# Patient Record
Sex: MALE | Race: BLACK OR AFRICAN AMERICAN | Employment: STUDENT | URBAN - METROPOLITAN AREA
[De-identification: names, ages, dates, MRNs, and addresses within clinical notes are randomized per-mention and may not be internally consistent; named-entity substitution may affect disease eponyms.]

---
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
## 2022-04-15 ENCOUNTER — LAB ENCOUNTER (OUTPATIENT)
Dept: LAB | Age: 22
End: 2022-04-15
Attending: INTERNAL MEDICINE
Payer: COMMERCIAL

## 2022-04-15 ENCOUNTER — OFFICE VISIT (OUTPATIENT)
Dept: INTERNAL MEDICINE CLINIC | Facility: CLINIC | Age: 22
End: 2022-04-15
Payer: COMMERCIAL

## 2022-04-15 VITALS
RESPIRATION RATE: 12 BRPM | OXYGEN SATURATION: 99 % | HEIGHT: 73.5 IN | WEIGHT: 157.81 LBS | DIASTOLIC BLOOD PRESSURE: 76 MMHG | SYSTOLIC BLOOD PRESSURE: 118 MMHG | TEMPERATURE: 98 F | BODY MASS INDEX: 20.47 KG/M2 | HEART RATE: 70 BPM

## 2022-04-15 DIAGNOSIS — E55.9 VITAMIN D DEFICIENCY: ICD-10-CM

## 2022-04-15 DIAGNOSIS — Z86.19 HISTORY OF HERPES SIMPLEX INFECTION: ICD-10-CM

## 2022-04-15 DIAGNOSIS — Z00.00 ENCOUNTER FOR PREVENTATIVE ADULT HEALTH CARE EXAMINATION: ICD-10-CM

## 2022-04-15 DIAGNOSIS — Z86.19 HISTORY OF HERPES SIMPLEX INFECTION: Primary | ICD-10-CM

## 2022-04-15 DIAGNOSIS — L98.9 SKIN LESION: ICD-10-CM

## 2022-04-15 DIAGNOSIS — Z00.00 ENCOUNTER FOR PREVENTATIVE ADULT HEALTH CARE EXAMINATION: Primary | ICD-10-CM

## 2022-04-15 LAB
ALBUMIN SERPL-MCNC: 4 G/DL (ref 3.4–5)
ALBUMIN/GLOB SERPL: 1 {RATIO} (ref 1–2)
ALP LIVER SERPL-CCNC: 62 U/L
ALT SERPL-CCNC: 45 U/L
ANION GAP SERPL CALC-SCNC: 6 MMOL/L (ref 0–18)
AST SERPL-CCNC: 27 U/L (ref 15–37)
BASOPHILS # BLD AUTO: 0.03 X10(3) UL (ref 0–0.2)
BASOPHILS NFR BLD AUTO: 0.5 %
BILIRUB SERPL-MCNC: 0.3 MG/DL (ref 0.1–2)
BUN BLD-MCNC: 25 MG/DL (ref 7–18)
CALCIUM BLD-MCNC: 9.6 MG/DL (ref 8.5–10.1)
CHLORIDE SERPL-SCNC: 104 MMOL/L (ref 98–112)
CHOLEST SERPL-MCNC: 146 MG/DL (ref ?–200)
CO2 SERPL-SCNC: 30 MMOL/L (ref 21–32)
CREAT BLD-MCNC: 0.97 MG/DL
EOSINOPHIL # BLD AUTO: 0.08 X10(3) UL (ref 0–0.7)
EOSINOPHIL NFR BLD AUTO: 1.3 %
ERYTHROCYTE [DISTWIDTH] IN BLOOD BY AUTOMATED COUNT: 13.2 %
EST. AVERAGE GLUCOSE BLD GHB EST-MCNC: 114 MG/DL (ref 68–126)
FASTING PATIENT LIPID ANSWER: YES
FASTING STATUS PATIENT QL REPORTED: YES
GLOBULIN PLAS-MCNC: 4.2 G/DL (ref 2.8–4.4)
GLUCOSE BLD-MCNC: 89 MG/DL (ref 70–99)
HBA1C MFR BLD: 5.6 % (ref ?–5.7)
HCT VFR BLD AUTO: 45.5 %
HDLC SERPL-MCNC: 47 MG/DL (ref 40–59)
HGB BLD-MCNC: 14.6 G/DL
IMM GRANULOCYTES # BLD AUTO: 0.02 X10(3) UL (ref 0–1)
IMM GRANULOCYTES NFR BLD: 0.3 %
LDLC SERPL CALC-MCNC: 86 MG/DL (ref ?–100)
LYMPHOCYTES # BLD AUTO: 1.35 X10(3) UL (ref 1.5–5)
LYMPHOCYTES NFR BLD AUTO: 21.8 %
MCH RBC QN AUTO: 27.6 PG (ref 26–34)
MCHC RBC AUTO-ENTMCNC: 32.1 G/DL (ref 31–37)
MCV RBC AUTO: 86 FL
MONOCYTES # BLD AUTO: 0.54 X10(3) UL (ref 0.1–1)
MONOCYTES NFR BLD AUTO: 8.7 %
NEUTROPHILS # BLD AUTO: 4.18 X10 (3) UL (ref 1.5–7.7)
NEUTROPHILS # BLD AUTO: 4.18 X10(3) UL (ref 1.5–7.7)
NEUTROPHILS NFR BLD AUTO: 67.4 %
NONHDLC SERPL-MCNC: 99 MG/DL (ref ?–130)
OSMOLALITY SERPL CALC.SUM OF ELEC: 294 MOSM/KG (ref 275–295)
PLATELET # BLD AUTO: 274 10(3)UL (ref 150–450)
POTASSIUM SERPL-SCNC: 4.5 MMOL/L (ref 3.5–5.1)
PROT SERPL-MCNC: 8.2 G/DL (ref 6.4–8.2)
RBC # BLD AUTO: 5.29 X10(6)UL
SODIUM SERPL-SCNC: 140 MMOL/L (ref 136–145)
TRIGL SERPL-MCNC: 63 MG/DL (ref 30–149)
TSI SER-ACNC: 1.54 MIU/ML (ref 0.36–3.74)
VIT D+METAB SERPL-MCNC: 20.8 NG/ML (ref 30–100)
VLDLC SERPL CALC-MCNC: 10 MG/DL (ref 0–30)
WBC # BLD AUTO: 6.2 X10(3) UL (ref 4–11)

## 2022-04-15 PROCEDURE — 80053 COMPREHEN METABOLIC PANEL: CPT

## 2022-04-15 PROCEDURE — 3078F DIAST BP <80 MM HG: CPT | Performed by: INTERNAL MEDICINE

## 2022-04-15 PROCEDURE — 85025 COMPLETE CBC W/AUTO DIFF WBC: CPT

## 2022-04-15 PROCEDURE — 3008F BODY MASS INDEX DOCD: CPT | Performed by: INTERNAL MEDICINE

## 2022-04-15 PROCEDURE — 36415 COLL VENOUS BLD VENIPUNCTURE: CPT

## 2022-04-15 PROCEDURE — 84443 ASSAY THYROID STIM HORMONE: CPT

## 2022-04-15 PROCEDURE — 82306 VITAMIN D 25 HYDROXY: CPT

## 2022-04-15 PROCEDURE — 3074F SYST BP LT 130 MM HG: CPT | Performed by: INTERNAL MEDICINE

## 2022-04-15 PROCEDURE — 99395 PREV VISIT EST AGE 18-39: CPT | Performed by: INTERNAL MEDICINE

## 2022-04-15 PROCEDURE — 80061 LIPID PANEL: CPT

## 2022-04-15 PROCEDURE — 83036 HEMOGLOBIN GLYCOSYLATED A1C: CPT

## 2022-04-15 PROCEDURE — 87529 HSV DNA AMP PROBE: CPT

## 2022-04-15 PROCEDURE — 86694 HERPES SIMPLEX NES ANTBDY: CPT

## 2022-04-15 NOTE — PATIENT INSTRUCTIONS
- Get blood work done today  - See handout on HSV  - Go to a health professional any time you have an outbreak (tingling/burning, bumps/skin lesions in genital area)  - Your leg rash is not related to HSV. Likely infected hair follicles. Use triamcinolone steroid cream as needed  - Follow up with Dr. Valentina Cameron when you are back in town in the summer to discuss your excessive sweating. It was a pleasure seeing you in the clinic today. Thank you for choosing the [facility] office for your healthcare needs. Please call at [phone] with any questions or concerns.     Justin Coto MD

## 2022-04-17 PROBLEM — Z86.19 HISTORY OF HERPES SIMPLEX INFECTION: Status: ACTIVE | Noted: 2022-04-17

## 2022-04-19 LAB
HSV 1 SUBTYPE BY PCR: NOT DETECTED
HSV 2 SUBTYPE BY PCR: NOT DETECTED

## 2022-07-28 ENCOUNTER — OFFICE VISIT (OUTPATIENT)
Dept: INTERNAL MEDICINE CLINIC | Facility: CLINIC | Age: 22
End: 2022-07-28
Payer: COMMERCIAL

## 2022-07-28 VITALS
HEART RATE: 87 BPM | OXYGEN SATURATION: 98 % | RESPIRATION RATE: 16 BRPM | TEMPERATURE: 98 F | WEIGHT: 160.19 LBS | DIASTOLIC BLOOD PRESSURE: 60 MMHG | SYSTOLIC BLOOD PRESSURE: 104 MMHG | BODY MASS INDEX: 20.78 KG/M2 | HEIGHT: 73.62 IN

## 2022-07-28 DIAGNOSIS — B00.9 RECURRENT HERPES SIMPLEX: Primary | ICD-10-CM

## 2022-07-28 DIAGNOSIS — N50.819 TESTICULAR DISCOMFORT: ICD-10-CM

## 2022-07-28 DIAGNOSIS — Z23 NEED FOR DIPHTHERIA-TETANUS-PERTUSSIS (TDAP) VACCINE: ICD-10-CM

## 2022-07-28 DIAGNOSIS — E55.9 VITAMIN D DEFICIENCY: ICD-10-CM

## 2022-07-28 PROCEDURE — 3074F SYST BP LT 130 MM HG: CPT | Performed by: INTERNAL MEDICINE

## 2022-07-28 PROCEDURE — 99214 OFFICE O/P EST MOD 30 MIN: CPT | Performed by: INTERNAL MEDICINE

## 2022-07-28 PROCEDURE — 3008F BODY MASS INDEX DOCD: CPT | Performed by: INTERNAL MEDICINE

## 2022-07-28 PROCEDURE — 3078F DIAST BP <80 MM HG: CPT | Performed by: INTERNAL MEDICINE

## 2022-07-28 RX ORDER — VALACYCLOVIR HYDROCHLORIDE 500 MG/1
500 TABLET, FILM COATED ORAL 2 TIMES DAILY
Qty: 24 TABLET | Refills: 1 | Status: SHIPPED | OUTPATIENT
Start: 2022-07-28 | End: 2022-07-31

## 2022-07-28 NOTE — PATIENT INSTRUCTIONS
- Valacyclovir refilled. Take as needed for herpes flare ups. Take 1 tablet twice daily for 3 days.  - Generally, for a flare-up, you will feel tingling/burning throughout the day, not just at night when you are sleeping  - Let us know if you feel a large lump on your testicles, or if you notice significant swelling of the testicles/scrotum  - Your symptoms when you wear your belt too tight are from the belt pressing on your nerves (not herpes related)  - You are due for a tetanus booster shot. Due every 10 years. You can schedule a nurse visit by calling our office at [phone] or can get the tetanus shot at your [facility] department. It was a pleasure seeing you in the clinic today. Thank you for choosing the [facility] office for your healthcare needs. Please call at [phone] with any questions or concerns.     Gladis Bergman MD

## 2022-09-02 ENCOUNTER — PATIENT MESSAGE (OUTPATIENT)
Dept: INTERNAL MEDICINE CLINIC | Facility: CLINIC | Age: 22
End: 2022-09-02

## 2022-09-02 DIAGNOSIS — B00.9 RECURRENT HERPES SIMPLEX: ICD-10-CM

## 2022-09-03 RX ORDER — VALACYCLOVIR HYDROCHLORIDE 500 MG/1
500 TABLET, FILM COATED ORAL 2 TIMES DAILY
Qty: 24 TABLET | Refills: 1 | Status: SHIPPED | OUTPATIENT
Start: 2022-09-03 | End: 2022-09-06

## 2022-10-05 ENCOUNTER — PATIENT MESSAGE (OUTPATIENT)
Dept: INTERNAL MEDICINE CLINIC | Facility: CLINIC | Age: 22
End: 2022-10-05

## 2022-10-05 DIAGNOSIS — B00.9 RECURRENT HERPES SIMPLEX: ICD-10-CM

## 2022-10-05 RX ORDER — VALACYCLOVIR HYDROCHLORIDE 500 MG/1
500 TABLET, FILM COATED ORAL 2 TIMES DAILY
Qty: 24 TABLET | Refills: 1 | Status: CANCELLED
Start: 2022-10-05 | End: 2022-10-08

## 2022-10-28 ENCOUNTER — PATIENT MESSAGE (OUTPATIENT)
Dept: INTERNAL MEDICINE CLINIC | Facility: CLINIC | Age: 22
End: 2022-10-28

## 2022-10-28 NOTE — TELEPHONE ENCOUNTER
From: Yue Monae  To: Aaron Black MD  Sent: 10/28/2022 10:36 AM CDT  Subject: Refill    Hermelinda Singletary   I was wondering if you could send a prescription for me ?

## 2022-11-10 RX ORDER — ERYTHROMYCIN 5 MG/G
1 OINTMENT OPHTHALMIC NIGHTLY
Qty: 1 G | Refills: 0 | Status: SHIPPED | OUTPATIENT
Start: 2022-11-10

## 2022-12-02 DIAGNOSIS — B00.9 RECURRENT HERPES SIMPLEX: ICD-10-CM

## 2022-12-05 RX ORDER — VALACYCLOVIR HYDROCHLORIDE 500 MG/1
500 TABLET, FILM COATED ORAL 2 TIMES DAILY
Qty: 24 TABLET | Refills: 1 | Status: SHIPPED | OUTPATIENT
Start: 2022-12-05

## 2022-12-18 ENCOUNTER — PATIENT MESSAGE (OUTPATIENT)
Dept: INTERNAL MEDICINE CLINIC | Facility: CLINIC | Age: 22
End: 2022-12-18

## 2022-12-18 DIAGNOSIS — B00.9 RECURRENT HERPES SIMPLEX: ICD-10-CM

## 2022-12-19 RX ORDER — VALACYCLOVIR HYDROCHLORIDE 500 MG/1
500 TABLET, FILM COATED ORAL 2 TIMES DAILY
Qty: 24 TABLET | Refills: 1 | Status: SHIPPED | OUTPATIENT
Start: 2022-12-19 | End: 2023-01-05

## 2022-12-19 NOTE — TELEPHONE ENCOUNTER
From: Gavin Wiggins  To: Neha Dickinson MD  Sent: 12/18/2022 7:21 PM CST  Subject: Diagnoses     Good afternoon I was wondering if you could tell me the difference between herpes and HIV which I have and what type. Also what are the chances of this developing into something worse.

## 2023-01-05 RX ORDER — VALACYCLOVIR HYDROCHLORIDE 500 MG/1
500 TABLET, FILM COATED ORAL 2 TIMES DAILY
Qty: 24 TABLET | Refills: 1 | Status: SHIPPED | OUTPATIENT
Start: 2023-01-05

## 2023-01-05 NOTE — TELEPHONE ENCOUNTER
Future Appointments   Date Time Provider Onel Kamara   1/14/2023 11:30 AM Deanne Medina MD [facility] [place]     Scheduled as a follow up, pt not due for cpx. Pt aware.

## 2023-01-08 ENCOUNTER — PATIENT MESSAGE (OUTPATIENT)
Dept: INTERNAL MEDICINE CLINIC | Facility: CLINIC | Age: 23
End: 2023-01-08

## 2023-01-09 NOTE — TELEPHONE ENCOUNTER
Had the 8:30 tomorrow on hold for him - can we add him on at 8:30 AM 1/10/23 and send him a inEarth message once he is scheduled

## 2023-01-10 ENCOUNTER — OFFICE VISIT (OUTPATIENT)
Dept: INTERNAL MEDICINE CLINIC | Facility: CLINIC | Age: 23
End: 2023-01-10
Payer: COMMERCIAL

## 2023-01-10 VITALS
WEIGHT: 161.38 LBS | TEMPERATURE: 98 F | HEART RATE: 85 BPM | OXYGEN SATURATION: 100 % | HEIGHT: 73.62 IN | SYSTOLIC BLOOD PRESSURE: 110 MMHG | RESPIRATION RATE: 16 BRPM | DIASTOLIC BLOOD PRESSURE: 70 MMHG | BODY MASS INDEX: 20.93 KG/M2

## 2023-01-10 DIAGNOSIS — E55.9 VITAMIN D DEFICIENCY: ICD-10-CM

## 2023-01-10 DIAGNOSIS — B00.9 RECURRENT HERPES SIMPLEX: Primary | ICD-10-CM

## 2023-01-10 DIAGNOSIS — Z23 NEED FOR IMMUNIZATION AGAINST INFLUENZA: ICD-10-CM

## 2023-01-10 PROCEDURE — 90471 IMMUNIZATION ADMIN: CPT | Performed by: INTERNAL MEDICINE

## 2023-01-10 PROCEDURE — 90686 IIV4 VACC NO PRSV 0.5 ML IM: CPT | Performed by: INTERNAL MEDICINE

## 2023-01-10 PROCEDURE — 3074F SYST BP LT 130 MM HG: CPT | Performed by: INTERNAL MEDICINE

## 2023-01-10 PROCEDURE — 99214 OFFICE O/P EST MOD 30 MIN: CPT | Performed by: INTERNAL MEDICINE

## 2023-01-10 PROCEDURE — 87529 HSV DNA AMP PROBE: CPT | Performed by: INTERNAL MEDICINE

## 2023-01-10 PROCEDURE — 3078F DIAST BP <80 MM HG: CPT | Performed by: INTERNAL MEDICINE

## 2023-01-10 PROCEDURE — 3008F BODY MASS INDEX DOCD: CPT | Performed by: INTERNAL MEDICINE

## 2023-01-10 RX ORDER — VALACYCLOVIR HYDROCHLORIDE 500 MG/1
500 TABLET, FILM COATED ORAL DAILY
Qty: 90 TABLET | Refills: 3 | Status: SHIPPED | OUTPATIENT
Start: 2023-01-10

## 2023-01-10 NOTE — PATIENT INSTRUCTIONS
- We will start you on daily preventative dosing for the herpes. Take 1 tablet of valacyclovir (500 mg) every day  - Use condoms at all times  - Do not have intercourse when you have bumps/rash/blisters in genital area  - Will swab your scars for herpes and send to the lab as well. It was a pleasure seeing you in the clinic today. Thank you for choosing the [facility] office for your healthcare needs. Please call at [phone] with any questions or concerns.     Shama Poole MD

## 2023-01-11 LAB
HSV1 DNA SPEC QL NAA+PROBE: NEGATIVE
HSV2 DNA SPEC QL NAA+PROBE: NEGATIVE

## 2023-03-07 DIAGNOSIS — B00.9 RECURRENT HERPES SIMPLEX: ICD-10-CM

## 2023-03-08 ENCOUNTER — PATIENT MESSAGE (OUTPATIENT)
Dept: INTERNAL MEDICINE CLINIC | Facility: CLINIC | Age: 23
End: 2023-03-08

## 2023-03-08 RX ORDER — VALACYCLOVIR HYDROCHLORIDE 500 MG/1
500 TABLET, FILM COATED ORAL DAILY
Qty: 90 TABLET | Refills: 3 | OUTPATIENT
Start: 2023-03-08

## 2023-05-22 DIAGNOSIS — B00.9 RECURRENT HERPES SIMPLEX: ICD-10-CM

## 2023-05-23 RX ORDER — VALACYCLOVIR HYDROCHLORIDE 500 MG/1
500 TABLET, FILM COATED ORAL DAILY
Qty: 90 TABLET | Refills: 3 | OUTPATIENT
Start: 2023-05-23

## 2023-05-28 DIAGNOSIS — B00.9 RECURRENT HERPES SIMPLEX: ICD-10-CM

## 2023-05-30 RX ORDER — VALACYCLOVIR HYDROCHLORIDE 500 MG/1
500 TABLET, FILM COATED ORAL DAILY
Qty: 90 TABLET | Refills: 3 | Status: SHIPPED | OUTPATIENT
Start: 2023-05-30

## 2023-08-14 DIAGNOSIS — B00.9 RECURRENT HERPES SIMPLEX: ICD-10-CM

## 2023-08-14 RX ORDER — VALACYCLOVIR HYDROCHLORIDE 500 MG/1
500 TABLET, FILM COATED ORAL DAILY
Qty: 90 TABLET | Refills: 3 | Status: CANCELLED | OUTPATIENT
Start: 2023-08-14

## 2023-08-30 DIAGNOSIS — B00.9 RECURRENT HERPES SIMPLEX: ICD-10-CM

## 2023-08-30 RX ORDER — VALACYCLOVIR HYDROCHLORIDE 500 MG/1
500 TABLET, FILM COATED ORAL DAILY
Qty: 90 TABLET | Refills: 3 | Status: SHIPPED | OUTPATIENT
Start: 2023-08-30

## 2023-10-11 ENCOUNTER — PATIENT MESSAGE (OUTPATIENT)
Dept: INTERNAL MEDICINE CLINIC | Facility: CLINIC | Age: 23
End: 2023-10-11

## 2023-11-27 DIAGNOSIS — B00.9 RECURRENT HERPES SIMPLEX: ICD-10-CM

## 2023-11-27 RX ORDER — VALACYCLOVIR HYDROCHLORIDE 500 MG/1
500 TABLET, FILM COATED ORAL DAILY
Qty: 90 TABLET | Refills: 3 | Status: CANCELLED | OUTPATIENT
Start: 2023-11-27

## 2023-11-27 NOTE — TELEPHONE ENCOUNTER
valACYclovir 500 MG Oral Tab         Sig: Take 1 tablet (500 mg total) by mouth daily. Take for flare-ups. Disp: 90 tablet    Refills: 3    Start: 11/27/2023    Class: Normal    Non-formulary For: Recurrent herpes simplex    To pharmacy: DX Code B00.9    Last ordered: 2 months ago (8/30/2023) by Marcelino Ellsworth MD    Patient comment: I usually  3 bottles of it which lasts me about 3 months i would like to continue doing that    Herpes Agent Protocol [id number]/27/2023 03:56 PM    Appointment in the past 12 or next 3 months      LOV 1/10/23  RTC 6 months  Filled 90 tabs 3 refills   No future appointments.

## 2024-08-16 ENCOUNTER — PATIENT MESSAGE (OUTPATIENT)
Dept: INTERNAL MEDICINE CLINIC | Facility: CLINIC | Age: 24
End: 2024-08-16

## 2024-08-16 DIAGNOSIS — B00.9 RECURRENT HERPES SIMPLEX: ICD-10-CM

## 2024-08-19 RX ORDER — VALACYCLOVIR HYDROCHLORIDE 500 MG/1
500 TABLET, FILM COATED ORAL DAILY
Qty: 90 TABLET | Refills: 3 | Status: SHIPPED | OUTPATIENT
Start: 2024-08-19

## 2024-08-20 NOTE — TELEPHONE ENCOUNTER
From: Jose Luis Thomas  To: Deanne Hoff  Sent: 8/16/2024 6:07 PM CDT  Subject: Refill prescription/ hairline help     Hello hope life’s been good my corners of my hairline are thinning out really bad and I’m 24 I don’t want to literally go bald and I’m adopted so krystyna my genes but if I can make it to 32 with hair that would be great any medication proven to work or anything let me know please. The photo doesn’t do justice bc my hairline is tapered intentionally so my dreads wouldn’t pull in my hairline but the corners hang down covering bald spots     Also could I get the three months upfront at [facility] in [place] I’m going down tomorrow   
RP: please see mcm, is there anything for hair thinning? Or best to set up OV to discuss? Pt hasn't been seen in office for over 1.5 years  
Responded to patient in [facility]  
Valcyclovir refilled to [facility] in [place]  Recommend follow up with Dermatology for hair loss/hairline    [facility]  [address]  [place], [place] [address] [phone]    [facility]  [address], [address]   [place] [address]  [phone]    [facility]  [facility] [place] [address]  T: [phone]  
stated

## 2025-02-10 ENCOUNTER — TELEPHONE (OUTPATIENT)
Dept: INTERNAL MEDICINE CLINIC | Facility: CLINIC | Age: 25
End: 2025-02-10

## 2025-02-10 NOTE — TELEPHONE ENCOUNTER
Spoke with patient, informed him of walk-in clinics and urgent cares associated with [facility] in [place] for him to be evaluated since he is unable to schedule with RP.

## 2025-05-25 DIAGNOSIS — B00.9 RECURRENT HERPES SIMPLEX: ICD-10-CM

## 2025-05-27 RX ORDER — VALACYCLOVIR HYDROCHLORIDE 500 MG/1
500 TABLET, FILM COATED ORAL DAILY
Qty: 90 TABLET | Refills: 3 | OUTPATIENT
Start: 2025-05-27

## 2025-08-22 ENCOUNTER — OFFICE VISIT (OUTPATIENT)
Dept: INTERNAL MEDICINE CLINIC | Facility: CLINIC | Age: 25
End: 2025-08-22

## 2025-08-22 VITALS
RESPIRATION RATE: 18 BRPM | DIASTOLIC BLOOD PRESSURE: 78 MMHG | TEMPERATURE: 97 F | HEIGHT: 74.41 IN | HEART RATE: 89 BPM | OXYGEN SATURATION: 99 % | WEIGHT: 161 LBS | BODY MASS INDEX: 20.44 KG/M2 | SYSTOLIC BLOOD PRESSURE: 116 MMHG

## 2025-08-22 DIAGNOSIS — E55.9 VITAMIN D DEFICIENCY: ICD-10-CM

## 2025-08-22 DIAGNOSIS — Z23 NEED FOR DIPHTHERIA-TETANUS-PERTUSSIS (TDAP) VACCINE: ICD-10-CM

## 2025-08-22 DIAGNOSIS — Z00.00 ENCOUNTER FOR PREVENTATIVE ADULT HEALTH CARE EXAMINATION: Primary | ICD-10-CM

## 2025-08-25 DIAGNOSIS — B00.9 RECURRENT HERPES SIMPLEX: ICD-10-CM

## 2025-08-26 RX ORDER — VALACYCLOVIR HYDROCHLORIDE 500 MG/1
500 TABLET, FILM COATED ORAL DAILY
Qty: 90 TABLET | Refills: 3 | Status: SHIPPED | OUTPATIENT
Start: 2025-08-26

## (undated) NOTE — LETTER
08/25/20        [address] [facility]      Dear Mustapha Rice,    Our records indicate that you have outstanding lab work and or testing that was ordered for you and has not yet been completed: Fasting lab work - (at least 10 hours-d

## (undated) NOTE — LETTER
09/17/18        [address] [facility]      Dear Aurea Her,    Our records indicate that you have outstanding lab work and or testing that was ordered for you and has not yet been completed:  Orders Placed This Encounter      [facility]

## (undated) NOTE — LETTER
07/01/19 [facility]      Dear Vita Malagon,    Our records indicate that you have outstanding lab work and or testing that was ordered for you and has not yet been completed: Non fasting lab work repeat -   To comp

## (undated) NOTE — LETTER
Date: 3/7/2019    Patient Name: Mckenna Gastelum          To Whom it may concern: This letter has been written at the patient's request. The above patient was seen at the [facility] for treatment of a medical condition.     This patient shoul

## (undated) NOTE — LETTER
[facility] [place]  [address] [address]  Dept: [phone]  Dept Fax: [phone]         April 19, 2019    Patient: Shelbie Varghese   YOB: 2000   Date of Visit: 4/19/2019       To Whom It May Concern: